# Patient Record
Sex: MALE | Race: WHITE | Employment: FULL TIME | URBAN - METROPOLITAN AREA
[De-identification: names, ages, dates, MRNs, and addresses within clinical notes are randomized per-mention and may not be internally consistent; named-entity substitution may affect disease eponyms.]

---

## 2023-12-11 ENCOUNTER — HOSPITAL ENCOUNTER (EMERGENCY)
Age: 58
Discharge: HOME | End: 2023-12-11
Attending: EMERGENCY MEDICINE
Payer: COMMERCIAL

## 2023-12-11 ENCOUNTER — APPOINTMENT (OUTPATIENT)
Dept: CT IMAGING | Age: 58
End: 2023-12-11
Attending: PHYSICIAN ASSISTANT
Payer: COMMERCIAL

## 2023-12-11 VITALS
HEIGHT: 68 IN | TEMPERATURE: 97 F | RESPIRATION RATE: 22 BRPM | WEIGHT: 163 LBS | OXYGEN SATURATION: 99 % | SYSTOLIC BLOOD PRESSURE: 146 MMHG | BODY MASS INDEX: 24.71 KG/M2 | DIASTOLIC BLOOD PRESSURE: 85 MMHG | HEART RATE: 55 BPM

## 2023-12-11 DIAGNOSIS — N20.1 URETERAL STONE: Primary | ICD-10-CM

## 2023-12-11 DIAGNOSIS — N13.5 URETEROVESICAL JUNCTION (UVJ) OBSTRUCTION: ICD-10-CM

## 2023-12-11 LAB
ALBUMIN SERPL-MCNC: 3.7 G/DL (ref 3.4–5)
ALBUMIN/GLOB SERPL: 1.1 {RATIO} (ref 1–2)
ALP LIVER SERPL-CCNC: 88 U/L
ALT SERPL-CCNC: 20 U/L
ANION GAP SERPL CALC-SCNC: 5 MMOL/L (ref 0–18)
AST SERPL-CCNC: 11 U/L (ref 15–37)
BASOPHILS # BLD AUTO: 0.05 X10(3) UL (ref 0–0.2)
BASOPHILS NFR BLD AUTO: 0.6 %
BILIRUB SERPL-MCNC: 0.5 MG/DL (ref 0.1–2)
BUN BLD-MCNC: 14 MG/DL (ref 9–23)
CALCIUM BLD-MCNC: 8.9 MG/DL (ref 8.5–10.1)
CHLORIDE SERPL-SCNC: 107 MMOL/L (ref 98–112)
CO2 SERPL-SCNC: 28 MMOL/L (ref 21–32)
CREAT BLD-MCNC: 1.15 MG/DL
EGFRCR SERPLBLD CKD-EPI 2021: 74 ML/MIN/1.73M2 (ref 60–?)
EOSINOPHIL # BLD AUTO: 0.06 X10(3) UL (ref 0–0.7)
EOSINOPHIL NFR BLD AUTO: 0.8 %
ERYTHROCYTE [DISTWIDTH] IN BLOOD BY AUTOMATED COUNT: 13.3 %
GLOBULIN PLAS-MCNC: 3.5 G/DL (ref 2.8–4.4)
GLUCOSE BLD-MCNC: 124 MG/DL (ref 70–99)
HCT VFR BLD AUTO: 43.6 %
HGB BLD-MCNC: 14.4 G/DL
IMM GRANULOCYTES # BLD AUTO: 0.01 X10(3) UL (ref 0–1)
IMM GRANULOCYTES NFR BLD: 0.1 %
LIPASE SERPL-CCNC: 41 U/L (ref 13–75)
LYMPHOCYTES # BLD AUTO: 2.06 X10(3) UL (ref 1–4)
LYMPHOCYTES NFR BLD AUTO: 25.9 %
MCH RBC QN AUTO: 31.2 PG (ref 26–34)
MCHC RBC AUTO-ENTMCNC: 33 G/DL (ref 31–37)
MCV RBC AUTO: 94.4 FL
MONOCYTES # BLD AUTO: 0.6 X10(3) UL (ref 0.1–1)
MONOCYTES NFR BLD AUTO: 7.5 %
NEUTROPHILS # BLD AUTO: 5.18 X10 (3) UL (ref 1.5–7.7)
NEUTROPHILS # BLD AUTO: 5.18 X10(3) UL (ref 1.5–7.7)
NEUTROPHILS NFR BLD AUTO: 65.1 %
OSMOLALITY SERPL CALC.SUM OF ELEC: 292 MOSM/KG (ref 275–295)
PLATELET # BLD AUTO: 287 10(3)UL (ref 150–450)
POTASSIUM SERPL-SCNC: 3.3 MMOL/L (ref 3.5–5.1)
PROT SERPL-MCNC: 7.2 G/DL (ref 6.4–8.2)
RBC # BLD AUTO: 4.62 X10(6)UL
SODIUM SERPL-SCNC: 140 MMOL/L (ref 136–145)
WBC # BLD AUTO: 8 X10(3) UL (ref 4–11)

## 2023-12-11 PROCEDURE — 99285 EMERGENCY DEPT VISIT HI MDM: CPT

## 2023-12-11 PROCEDURE — 99284 EMERGENCY DEPT VISIT MOD MDM: CPT

## 2023-12-11 PROCEDURE — 96375 TX/PRO/DX INJ NEW DRUG ADDON: CPT

## 2023-12-11 PROCEDURE — 85025 COMPLETE CBC W/AUTO DIFF WBC: CPT | Performed by: PHYSICIAN ASSISTANT

## 2023-12-11 PROCEDURE — 96374 THER/PROPH/DIAG INJ IV PUSH: CPT

## 2023-12-11 PROCEDURE — 74176 CT ABD & PELVIS W/O CONTRAST: CPT | Performed by: PHYSICIAN ASSISTANT

## 2023-12-11 PROCEDURE — 80053 COMPREHEN METABOLIC PANEL: CPT | Performed by: PHYSICIAN ASSISTANT

## 2023-12-11 PROCEDURE — 96361 HYDRATE IV INFUSION ADD-ON: CPT

## 2023-12-11 PROCEDURE — 96376 TX/PRO/DX INJ SAME DRUG ADON: CPT

## 2023-12-11 PROCEDURE — 83690 ASSAY OF LIPASE: CPT | Performed by: PHYSICIAN ASSISTANT

## 2023-12-11 RX ORDER — TAMSULOSIN HYDROCHLORIDE 0.4 MG/1
0.4 CAPSULE ORAL DAILY
Qty: 7 CAPSULE | Refills: 0 | Status: SHIPPED | OUTPATIENT
Start: 2023-12-11 | End: 2023-12-18

## 2023-12-11 RX ORDER — HYDROMORPHONE HYDROCHLORIDE 1 MG/ML
0.5 INJECTION, SOLUTION INTRAMUSCULAR; INTRAVENOUS; SUBCUTANEOUS ONCE
Status: COMPLETED | OUTPATIENT
Start: 2023-12-11 | End: 2023-12-11

## 2023-12-11 RX ORDER — KETOROLAC TROMETHAMINE 30 MG/ML
30 INJECTION, SOLUTION INTRAMUSCULAR; INTRAVENOUS ONCE
Status: COMPLETED | OUTPATIENT
Start: 2023-12-11 | End: 2023-12-11

## 2023-12-11 RX ORDER — HYDROCODONE BITARTRATE AND ACETAMINOPHEN 5; 325 MG/1; MG/1
1-2 TABLET ORAL EVERY 6 HOURS PRN
Qty: 10 TABLET | Refills: 0 | Status: SHIPPED | OUTPATIENT
Start: 2023-12-11 | End: 2023-12-16

## 2023-12-12 NOTE — DISCHARGE INSTRUCTIONS
Push fluids. Start Flomax. Norco as needed. Report to the [address] [facility],[address] for any worsening or failure to pass stone.

## 2025-03-07 ENCOUNTER — OFFICE VISIT (OUTPATIENT)
Dept: FAMILY MEDICINE CLINIC | Facility: CLINIC | Age: 60
End: 2025-03-07
Payer: COMMERCIAL

## 2025-03-07 VITALS
DIASTOLIC BLOOD PRESSURE: 78 MMHG | SYSTOLIC BLOOD PRESSURE: 122 MMHG | OXYGEN SATURATION: 97 % | BODY MASS INDEX: 24.71 KG/M2 | HEART RATE: 69 BPM | HEIGHT: 68 IN | WEIGHT: 163 LBS | RESPIRATION RATE: 18 BRPM

## 2025-03-07 DIAGNOSIS — M25.512 CHRONIC PAIN OF BOTH SHOULDERS: ICD-10-CM

## 2025-03-07 DIAGNOSIS — Z12.11 SCREEN FOR COLON CANCER: ICD-10-CM

## 2025-03-07 DIAGNOSIS — Z00.00 ENCOUNTER FOR ANNUAL PHYSICAL EXAM: Primary | ICD-10-CM

## 2025-03-07 DIAGNOSIS — R53.82 CHRONIC FATIGUE: ICD-10-CM

## 2025-03-07 DIAGNOSIS — Z12.5 SCREENING FOR PROSTATE CANCER: ICD-10-CM

## 2025-03-07 DIAGNOSIS — Z86.59 HX OF MAJOR DEPRESSION: ICD-10-CM

## 2025-03-07 DIAGNOSIS — G89.29 CHRONIC PAIN OF BOTH SHOULDERS: ICD-10-CM

## 2025-03-07 DIAGNOSIS — M25.511 CHRONIC PAIN OF BOTH SHOULDERS: ICD-10-CM

## 2025-03-07 PROCEDURE — 3078F DIAST BP <80 MM HG: CPT

## 2025-03-07 PROCEDURE — 3074F SYST BP LT 130 MM HG: CPT

## 2025-03-07 PROCEDURE — 3008F BODY MASS INDEX DOCD: CPT

## 2025-03-07 PROCEDURE — 99386 PREV VISIT NEW AGE 40-64: CPT

## 2025-03-07 NOTE — PROGRESS NOTES
[facility] Office Note - Annual Physical Exam  Chief Complaint:   Chief Complaint   Patient presents with    [facility]       HPI:   Oswaldo Lema is a 60 year old male coming in for an annual physical exam. He is a new patient to our office and plans to establish care with Dr. Mares in our office.  He has rescheduled an upcoming appointment with Dr. Mares at [facility] on June 6, 2025.      Any Complaints?:  States that he is in chronic pain, dizziness and fatigue for multiple years.  Reports that he has had a history of multiple back surgeries as well as chronic pain in bilateral shoulders in which he received previous cortisone shots in both shoulders and only provide temporary relief. Had back surgery 2 yrs ago. Most recent back surgery was for L4 and other other surgery was L5-S1 in the 1990s.  States he tries to go to the gym to help relieve the pain does not exercise on his own.  Has tried physical therapy in the past but reports has not been beneficial for him for pain relief.Furthermore, he states he gets shooting pain in his feet that happens periodically and he has to put his feet on cold stones at times just to help the pain go away.      States he feels like this dizziness has been going on for at least 20 years.  Reports that previously he has had multiple labs done by his former primary care provider as well as seeing rheumatology, but there has been no true origin found of his dizziness.  He states that he feels like his dizziness feels like he is high.  States he previously saw a rheumatologist around 4 years ago due to swelling in his wrists and forearm pain.  Labs, sleep study, blood work were all done but no conclusions were made on the origin of the dizziness.    Furthermore, he feels like he has been dealing with feelings of depression for years.  States his feeling of depression feels like it is a constant stock daily.  He states in the past, every time he was  started on antidepressant medication, he will get manic and eventually stop the medication.  He states that the last antidepressant he took was Lexapro.  He feels like his depression is less severe when he is occupied with exercise and trying to stay busy.  He states overall he feels pretty overwhelmed with depression and fatigue.  He states they previously saw a counselor for the depression, but did not get much relief from it.    Routine exercise? Going gym 5 days a week. Does elipitical and light lifiting due to previous back surgery  Drinks water daily? No really, but trying to drink more.  Eats variety of fruits, vegetables and lean meats?: yes  Any issues with sleep? Yes, hard to get comfortable with pain in both shoulders.  Seeing Dentist yearly?: yes appointment next week  Seeing eye doctor if needed? Yes, goes [facility]    Any changes in size/consistency of bowel movements?: no, thinks he had a colonscopy 3-4 yrs ago but can't remember exact date   Any skin issues? No, Has hx of melanoma on right upper chest in 2022    New or current Partner?: girlfriend of 3 yrs    States alcoholism and addiction runs in family    CAGE Screening  CAGE Alcohol Screening       3/7/2025    10:29 AM   CAGE Flowsheet   Have you ever felt you should Cut down on your drinking? 0   Have people Annoyed you by criticizing your drinking? 0   Have you ever felt bad or Guilty about your drinking? 0   Have you ever had a drink first thing in the morning to steady your nerves or to get rid of a hangover (Eye opener)? 0   Total Score: Item responses on the CAGE are scored 0 or 1, with a higher score an indication of alcohol 0   Total Score: Item responses on the CAGE are scored 0 or 1, with a higher score an indication of alcohol No Risk        Annual Depression Screening PHQ2  Depression Screening (PHQ-2/PHQ-9): Over the LAST 2 WEEKS   Little interest or pleasure in doing things: Not at all    Feeling down, depressed, or  hopeless: Not at all    PHQ-2 SCORE: 0        PHQ-2 SCORE: 0  , done 3/7/2025   Last [place] Suicide Screening on 3/7/2025 was No Risk.      Faulk Suicide Screening  Faulk Suicide Severity Rating Scale Screener   In what setting is the screener performed?: an office visit  1. Have you wished you were dead or wished you could go to sleep and not wake up? (past 30 days): No  2. Have you actually had any thoughts of killing yourself? (past 30 days): No  6. Have you ever done anything, started to do anything, or prepared to do anything to end your life? (lifetime): No  Score and Suggested Actions - Office Visit: No Risk  Score and Intervention  Score and Suggested Actions - Office Visit: No Risk      Wt Readings from Last 6 Encounters:   25 163 lb (73.9 kg)   23 163 lb (73.9 kg)   22 176 lb (79.8 kg)   10/09/15 190 lb (86.2 kg)        BP Readings from Last 3 Encounters:   25 122/78   23 146/85   22 134/78        Past Medical History:    Allergic rhinitis    Anxiety    Arthritis    Back pain    Calculus of kidney    Cancer (HCC)    melanoma    Depression    Esophageal reflux    High cholesterol    Hyperlipidemia     Past Surgical History:   Procedure Laterality Date    Appendectomy      Appendectomy      Back surgery      Cholecystectomy      Colonoscopy      Hernia surgery      Laparoscopic cholecystectomy      Other surgical history      hernia surgery    Other surgical history      knee surgery r    Removal gallbladder      Vasectomy       Social History:  Social History     Socioeconomic History    Marital status:    Tobacco Use    Smoking status: Former     Current packs/day: 0.00     Types: Cigarettes     Quit date: 10/31/2005     Years since quittin.3    Smokeless tobacco: Never    Tobacco comments:     quit 15 years ago   Vaping Use    Vaping status: Never Used   Substance and Sexual Activity    Alcohol use: Yes     Alcohol/week: 3.0 standard drinks  of alcohol     Types: 3 Cans of beer per week     Comment: socially    Drug use: No   Other Topics Concern    Caffeine Concern No    Exercise Yes    Seat Belt Yes    Special Diet No    Stress Concern No    Weight Concern No     Family History:  Family History   Problem Relation Age of Onset    Heart Disorder Father     Heart Disorder Mother     Cancer Sister     Cancer Sister     Depression Brother      Allergies:  Allergies   Allergen Reactions    Codeine      Current Meds:  No current outpatient medications on file.      Counseling given: Not Answered  Tobacco comments: quit 15 years ago         REVIEW OF SYSTEMS:   Review of Systems   Constitutional:  Positive for fatigue. Negative for activity change, appetite change and unexpected weight change.   HENT:  Negative for congestion.    Eyes:  Negative for photophobia and visual disturbance.   Respiratory:  Negative for cough and shortness of breath.    Cardiovascular:  Negative for chest pain and palpitations.   Gastrointestinal:  Negative for abdominal pain, constipation and diarrhea.   Genitourinary:  Negative for dysuria and urgency.   Musculoskeletal:  Positive for back pain.   Skin:  Negative for color change and wound.   Neurological:  Negative for dizziness, weakness and headaches.   Psychiatric/Behavioral:  Positive for dysphoric mood. Negative for self-injury and suicidal ideas.         EXAM:   /78   Pulse 69   Resp 18   Ht 5' 8\" (1.727 m)   Wt 163 lb (73.9 kg)   SpO2 97%   BMI 24.78 kg/m²  Estimated body mass index is 24.78 kg/m² as calculated from the following:    Height as of this encounter: 5' 8\" (1.727 m).    Weight as of this encounter: 163 lb (73.9 kg).   Vital signs reviewed.Appears stated age, well groomed.  Physical Exam  Constitutional:       Appearance: Normal appearance.   HENT:      Head: Normocephalic and atraumatic.      Right Ear: Tympanic membrane and ear canal normal.      Left Ear: Tympanic membrane and ear canal normal.       Nose: Nose normal. No congestion or rhinorrhea.      Mouth/Throat:      Mouth: Mucous membranes are moist.      Pharynx: Oropharynx is clear. No oropharyngeal exudate or posterior oropharyngeal erythema.   Eyes:      Extraocular Movements: Extraocular movements intact.      Conjunctiva/sclera: Conjunctivae normal.      Pupils: Pupils are equal, round, and reactive to light.   Cardiovascular:      Rate and Rhythm: Normal rate and regular rhythm.   Pulmonary:      Effort: Pulmonary effort is normal.      Breath sounds: Normal breath sounds.   Abdominal:      General: Abdomen is flat. Bowel sounds are normal.      Palpations: Abdomen is soft.   Skin:     General: Skin is warm and dry.   Neurological:      Mental Status: He is alert and oriented to person, place, and time.   Psychiatric:         Mood and Affect: Mood normal.         Behavior: Behavior normal.         Thought Content: Thought content normal.         Judgment: Judgment normal.        ASSESSMENT AND PLAN:   1. Encounter for annual physical exam  Influenza vaccine: declined  Tdap: declined  Shingles vaccine: declined  Pneumonia vaccine: declined  Colon cancer screening: Could not find previous records of colon cancer screening colonoscopy in his chart.  Order placed today for colonoscopy.  Annual Labs were ordered today.  Will reach out to Oswaldo with results.  Recommend exercise 30 minutes a day for at least 3 days a week and diet full of vegetables and fruits.  - CBC With Differential With Platelet; Future  - Comp Metabolic Panel (14); Future  - Lipid Panel; Future  - TSH W Reflex To Free T4; Future  - Hemoglobin A1C; Future    2. Hx of major depression  Referral placed to Stan Thakur of [facility] to initiate therapy. PHQ of 0 today.  I had discussion with Oswaldo that duloxetine could be possible antidepressant that could also help with his chronic pain.  However we both agreed that  it would be beneficial if he starts with Stan denise to see if his  chronic pain and depression could be improved without medication first  - OP REFERRAL TO [facility]    3. Chronic fatigue  Fatigue labs ordered to assess.  - TSH W Reflex To Free T4; Future  - Iron And Tibc; Future  - Ferritin; Future  - Hemoglobin A1C; Future    4. Chronic pain of both shoulders  Reviewed Oswaldo's chart and he did visit [facility] in 2023 where he was diagnosed with superior glenoid labrum lesion of the right shoulder, probably osteoarthritis of the right shoulder, pain in left shoulder and superior glenoid labrum lesion of the left shoulder.  He was treated with cortisone injections in both shoulders.  I had a long discussion with him that we could try physical therapy again to see if it help relieve the pain or possibly try pain management but he declined both.  I also discussed with him that we can refer to orthopedics again for second opinion if he still having chronic pain when he sees Dr. Mares in June.    5. Screen for colon cancer  Referral to general surgery placed for colonoscopy since after review of his chart there was no record of a previous colonoscopy  - SURGERY - INTERNAL    6. Screening for prostate cancer  Screening for prostate cancer placed today.  - PSA Total, Screen; Future    Meds & Refills for this Visit:  Requested Prescriptions      No prescriptions requested or ordered in this encounter         Imaging & Consults:  SURGERY - INTERNAL  OP REFERRAL TO [facility]  Orders Placed This Encounter   Procedures    SURGERY - INTERNAL     Referral Priority:   Routine     Referral Type:   OFFICE VISIT     Referred to Provider:   Glenroy Multani MD     Requested Specialty:   SURGERY, GENERAL     Number of Visits Requested:   1         Health Maintenance:  Health Maintenance   Topic Date Due    Annual Physical  Never done    Colorectal Cancer Screening  Never done    DTaP,Tdap,and Td Vaccines (1 - Tdap) Never done    Pneumococcal Vaccine: 50+ Years (1 of 1 - PCV) Never done     Zoster Vaccines (1 of 2) Never done    PSA  02/18/2016    COVID-19 Vaccine (2 - 2024-25 season) 09/01/2024    Influenza Vaccine (1) Never done    Annual Depression Screening  Never done    Meningococcal B Vaccine  Aged Out       Problem List:  There is no problem list on file for this patient.        Patient/Caregiver Education: Patient/Caregiver Education: There are no barriers to learning. Medical education done.   Outcome: Oswaldo Lema verbalizes understanding, agrees with the plan, and had no other questions at the end of today's visit. Oswaldo Lema is informed to call with any questions, complications, allergies, or worsening or changing symptoms.  Oswaldo Lema is to call with any side effects or complications from the treatments as a result of today.     Follow-up in   Return in about 13 weeks (around 6/6/2025) for establishing care with Dr. Mares.      Note to patient: The 21st Century Cures Act makes medical notes like these available to patients in the interest of transparency. However, this is a medical document intended as peer to peer communication. It is written in medical language and may contain abbreviations or verbiage that are unfamiliar. It may appear blunt or direct. Medical documents are intended to carry relevant information, facts as evident, and the clinical opinion of the practitioner.

## 2025-03-11 ENCOUNTER — LAB ENCOUNTER (OUTPATIENT)
Dept: LAB | Age: 60
End: 2025-03-11
Payer: COMMERCIAL

## 2025-03-11 DIAGNOSIS — Z00.00 ENCOUNTER FOR ANNUAL PHYSICAL EXAM: ICD-10-CM

## 2025-03-11 DIAGNOSIS — R53.82 CHRONIC FATIGUE: ICD-10-CM

## 2025-03-11 DIAGNOSIS — Z12.5 SCREENING FOR PROSTATE CANCER: ICD-10-CM

## 2025-03-11 LAB
ALBUMIN SERPL-MCNC: 4.5 G/DL (ref 3.2–4.8)
ALBUMIN/GLOB SERPL: 1.7 {RATIO} (ref 1–2)
ALP LIVER SERPL-CCNC: 102 U/L
ALT SERPL-CCNC: 32 U/L
ANION GAP SERPL CALC-SCNC: 7 MMOL/L (ref 0–18)
AST SERPL-CCNC: 28 U/L (ref ?–34)
BASOPHILS # BLD AUTO: 0.06 X10(3) UL (ref 0–0.2)
BASOPHILS NFR BLD AUTO: 1.2 %
BILIRUB SERPL-MCNC: 0.5 MG/DL (ref 0.2–1.1)
BUN BLD-MCNC: 15 MG/DL (ref 9–23)
CALCIUM BLD-MCNC: 9.2 MG/DL (ref 8.7–10.6)
CHLORIDE SERPL-SCNC: 106 MMOL/L (ref 98–112)
CHOLEST SERPL-MCNC: 271 MG/DL (ref ?–200)
CO2 SERPL-SCNC: 26 MMOL/L (ref 21–32)
COMPLEXED PSA SERPL-MCNC: 3.19 NG/ML (ref ?–4)
CREAT BLD-MCNC: 1.05 MG/DL
DEPRECATED HBV CORE AB SER IA-ACNC: 115 NG/ML
EGFRCR SERPLBLD CKD-EPI 2021: 81 ML/MIN/1.73M2 (ref 60–?)
EOSINOPHIL # BLD AUTO: 0.18 X10(3) UL (ref 0–0.7)
EOSINOPHIL NFR BLD AUTO: 3.5 %
ERYTHROCYTE [DISTWIDTH] IN BLOOD BY AUTOMATED COUNT: 13.6 %
EST. AVERAGE GLUCOSE BLD GHB EST-MCNC: 108 MG/DL (ref 68–126)
FASTING PATIENT LIPID ANSWER: YES
FASTING STATUS PATIENT QL REPORTED: YES
GLOBULIN PLAS-MCNC: 2.7 G/DL (ref 2–3.5)
GLUCOSE BLD-MCNC: 82 MG/DL (ref 70–99)
HBA1C MFR BLD: 5.4 % (ref ?–5.7)
HCT VFR BLD AUTO: 43.8 %
HDLC SERPL-MCNC: 64 MG/DL (ref 40–59)
HGB BLD-MCNC: 14.7 G/DL
IMM GRANULOCYTES # BLD AUTO: 0.01 X10(3) UL (ref 0–1)
IMM GRANULOCYTES NFR BLD: 0.2 %
IRON SATN MFR SERPL: 31 %
IRON SERPL-MCNC: 99 UG/DL
LDLC SERPL CALC-MCNC: 195 MG/DL (ref ?–100)
LYMPHOCYTES # BLD AUTO: 1.41 X10(3) UL (ref 1–4)
LYMPHOCYTES NFR BLD AUTO: 27.5 %
MCH RBC QN AUTO: 32.3 PG (ref 26–34)
MCHC RBC AUTO-ENTMCNC: 33.6 G/DL (ref 31–37)
MCV RBC AUTO: 96.3 FL
MONOCYTES # BLD AUTO: 0.45 X10(3) UL (ref 0.1–1)
MONOCYTES NFR BLD AUTO: 8.8 %
NEUTROPHILS # BLD AUTO: 3.01 X10 (3) UL (ref 1.5–7.7)
NEUTROPHILS # BLD AUTO: 3.01 X10(3) UL (ref 1.5–7.7)
NEUTROPHILS NFR BLD AUTO: 58.8 %
NONHDLC SERPL-MCNC: 207 MG/DL (ref ?–130)
OSMOLALITY SERPL CALC.SUM OF ELEC: 288 MOSM/KG (ref 275–295)
PLATELET # BLD AUTO: 259 10(3)UL (ref 150–450)
POTASSIUM SERPL-SCNC: 4.2 MMOL/L (ref 3.5–5.1)
PROT SERPL-MCNC: 7.2 G/DL (ref 5.7–8.2)
RBC # BLD AUTO: 4.55 X10(6)UL
SODIUM SERPL-SCNC: 139 MMOL/L (ref 136–145)
TOTAL IRON BINDING CAPACITY: 316 UG/DL (ref 250–425)
TRANSFERRIN SERPL-MCNC: 241 MG/DL (ref 215–365)
TRIGL SERPL-MCNC: 74 MG/DL (ref 30–149)
TSI SER-ACNC: 1.96 UIU/ML (ref 0.55–4.78)
VLDLC SERPL CALC-MCNC: 15 MG/DL (ref 0–30)
WBC # BLD AUTO: 5.1 X10(3) UL (ref 4–11)

## 2025-03-11 PROCEDURE — 85025 COMPLETE CBC W/AUTO DIFF WBC: CPT

## 2025-03-11 PROCEDURE — 83550 IRON BINDING TEST: CPT

## 2025-03-11 PROCEDURE — 84443 ASSAY THYROID STIM HORMONE: CPT

## 2025-03-11 PROCEDURE — 36415 COLL VENOUS BLD VENIPUNCTURE: CPT

## 2025-03-11 PROCEDURE — 82728 ASSAY OF FERRITIN: CPT

## 2025-03-11 PROCEDURE — 83036 HEMOGLOBIN GLYCOSYLATED A1C: CPT

## 2025-03-11 PROCEDURE — 80061 LIPID PANEL: CPT

## 2025-03-11 PROCEDURE — 80053 COMPREHEN METABOLIC PANEL: CPT

## 2025-03-11 PROCEDURE — 83540 ASSAY OF IRON: CPT

## 2025-03-25 ENCOUNTER — TELEPHONE (OUTPATIENT)
Dept: FAMILY MEDICINE CLINIC | Facility: CLINIC | Age: 60
End: 2025-03-25

## 2025-03-25 NOTE — TELEPHONE ENCOUNTER
Received information from insurance that referral for surgeon Dr Glenroy Multani was approved and patient can move forward and scheduled a consult for colon cancer screening. Patient was notified and states he will call and schedule.  No further questions at this time.

## 2025-04-28 ENCOUNTER — TELEPHONE (OUTPATIENT)
Dept: FAMILY MEDICINE CLINIC | Facility: CLINIC | Age: 60
End: 2025-04-28

## 2025-04-28 DIAGNOSIS — G89.29 CHRONIC LEFT SHOULDER PAIN: Primary | ICD-10-CM

## 2025-04-28 DIAGNOSIS — M25.512 CHRONIC LEFT SHOULDER PAIN: Primary | ICD-10-CM

## 2025-04-28 NOTE — TELEPHONE ENCOUNTER
Referral to [facility]donado signed for L shoulder pain.    Orders Placed This Encounter   Procedures    Ortho Referral - External     Referral Priority:   Routine     Referral Type:   Consultation     Referred to Provider:   Josh Maldonado MD     Requested Specialty:   Sports Medicine     Number of Visits Requested:   3

## 2025-04-28 NOTE — TELEPHONE ENCOUNTER
Received call from pt asking for referral to [facility], Dr. Maldonado team. Patient stated he has chronic L shoulder pain, this was discussed with Julio at [facility].     Julio- referral pended.

## 2025-05-09 ENCOUNTER — TELEPHONE (OUTPATIENT)
Facility: LOCATION | Age: 60
End: 2025-05-09

## 2025-05-09 ENCOUNTER — NURSE ONLY (OUTPATIENT)
Age: 60
End: 2025-05-09
Payer: COMMERCIAL

## 2025-05-09 DIAGNOSIS — Z12.11 ENCOUNTER FOR SCREENING COLONOSCOPY: Primary | ICD-10-CM

## 2025-05-09 RX ORDER — POLYETHYLENE GLYCOL 3350, SODIUM CHLORIDE, SODIUM BICARBONATE, POTASSIUM CHLORIDE 420; 11.2; 5.72; 1.48 G/4L; G/4L; G/4L; G/4L
POWDER, FOR SOLUTION ORAL
Qty: 1 EACH | Refills: 0 | Status: SHIPPED | OUTPATIENT
Start: 2025-05-09

## 2025-06-12 ENCOUNTER — PATIENT OUTREACH (OUTPATIENT)
Age: 60
End: 2025-06-12

## 2025-06-12 NOTE — PROGRESS NOTES
06/12/25 0939   Call Details   Call Attempt # 1   SDOH Domain(s) with needs Housing Instability;Interpersonal Safety   SDOH Free Text Details   Housing Instability Details Patient declines needing resources for housing instability at this time.   Interpersonal Safety Details Patient declines needing resources for interpersonal safety at this time.   Resources Provided   Resources Provided Patient declined   Outreach Outcome   SDOH Overall Outreach Outcome Patient declined     Patient states he is doing well at the time of call and is not interested in resources.

## 2025-06-20 PROBLEM — G89.29 OTHER CHRONIC PAIN: Status: ACTIVE | Noted: 2025-06-20

## 2025-06-20 PROBLEM — F39 MOOD DISORDER: Status: ACTIVE | Noted: 2025-06-20

## 2025-06-20 PROBLEM — E78.00 PURE HYPERCHOLESTEROLEMIA: Status: ACTIVE | Noted: 2025-06-20

## 2025-06-25 ENCOUNTER — PATIENT OUTREACH (OUTPATIENT)
Age: 60
End: 2025-06-25